# Patient Record
Sex: FEMALE | Race: WHITE
[De-identification: names, ages, dates, MRNs, and addresses within clinical notes are randomized per-mention and may not be internally consistent; named-entity substitution may affect disease eponyms.]

---
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
## 2021-11-25 ENCOUNTER — HOSPITAL ENCOUNTER (EMERGENCY)
Dept: HOSPITAL 61 - ER | Age: 21
Discharge: HOME | End: 2021-11-25
Payer: COMMERCIAL

## 2021-11-25 VITALS — HEIGHT: 66 IN | WEIGHT: 151.9 LBS | BODY MASS INDEX: 24.41 KG/M2

## 2021-11-25 VITALS — DIASTOLIC BLOOD PRESSURE: 65 MMHG | SYSTOLIC BLOOD PRESSURE: 125 MMHG

## 2021-11-25 DIAGNOSIS — F17.200: ICD-10-CM

## 2021-11-25 DIAGNOSIS — J06.9: Primary | ICD-10-CM

## 2021-11-25 PROCEDURE — 71045 X-RAY EXAM CHEST 1 VIEW: CPT

## 2021-11-25 PROCEDURE — 99283 EMERGENCY DEPT VISIT LOW MDM: CPT

## 2021-11-25 NOTE — RAD
EXAM: Chest, single view.



HISTORY: Cough.



COMPARISON: None.



FINDINGS: A frontal view of the chest is obtained. There is no infiltrate, pleural effusion or pneumo
thorax. The heart is normal in size.



IMPRESSION: No acute pulmonary finding.



Electronically signed by: Alix Arguello MD (11/25/2021 2:16 PM) [facility]

## 2023-06-02 NOTE — PHYS DOC
"- Kidney US 6/1 with impression of "Improved intraparenchymal arterial resistive indices which measure mildly elevated on today's exam. Improved main renal vein velocity at the anastomosis. Large minimally complex peritransplant collection extending the entire length of the allograft.  Collection may be new or represent significant interval enlargement of prior collection.".   - IR consulted for drain placement; appreciate assitance. Fluid studies ordered.    " Past Medical History


Past Medical History:  No Pertinent History


Past Surgical History:  Tonsillectomy


Smoking Status:  Current Every Day Smoker


Alcohol Use:  None


Drug Use:  Marijuana





General Adult


EDM:


Chief Complaint:  COUGH





HPI:


HPI:





Patient is a 21  year old female with no significant medical history presenting 

to the ED today complaining of cough, nasal congestion, subjective fevers, sore 

throat, symptoms began on Monday.  Patient is in the ED with the daughter with 

similar complaints.  She states she is fully vaccinated against COVID-19.





Review of Systems:


Review of Systems:


Constitutional:   reports fever


Eyes:   Denies change in visual acuity. []


HENT:   Reports nasal congestion and sore throat. [] 


Respiratory:   Reports cough, denies shortness of breath. [] 


Cardiovascular:   Denies chest pain or edema. [] 


GI:   Denies abdominal pain, nausea, vomiting, bloody stools or diarrhea. [] 


:  Denies dysuria. [] 


Musculoskeletal:   Denies back pain or joint pain. [] 


Integument:   Denies rash. [] 


Neurologic:   Denies headache, focal weakness or sensory changes. [] 


Psychiatric:  Denies depression or anxiety. []





Heart Score:


C/O Chest Pain:  N/A


Risk Factors:


Risk Factors:  DM, Current or recent (<one month) smoker, HTN, HLP, family 

history of CAD, obesity.


Risk Scores:


Score 0 - 3:  2.5% MACE over next 6 weeks - Discharge Home


Score 4 - 6:  20.3% MACE over next 6 weeks - Admit for Clinical Observation


Score 7 - 10:  72.7% MACE over next 6 weeks - Early Invasive Strategies





Allergies:


Allergies:





Allergies








Coded Allergies Type Severity Reaction Last Updated Verified


 


  No Known Drug Allergies    5/13/18 No











Physical Exam:


PE:





Constitutional: Well developed, well nourished, no acute distress, non-toxic 

appearance. []


HENT: Normocephalic, atraumatic, bilateral external ears normal, oropharynx 

moist, no oral exudates, nose normal. []


Eyes: PERRLA, EOMI, conjunctiva normal, no discharge. [] 


Neck: Normal range of motion, no tenderness, supple, no stridor. [] 


Cardiovascular:Heart rate regular rhythm, no murmur []


Lungs & Thorax:  Bilateral breath sounds clear to auscultation []


Abdomen: Bowel sounds normal, soft, no tenderness, no masses, no pulsatile 

masses. [] 


Skin: Warm, dry, no erythema, no rash. [] 


Back: No tenderness, no CVA tenderness. [] 


Extremities: No tenderness, no cyanosis, no clubbing, ROM intact, no edema. [] 


Neurologic: Alert and oriented X 3, normal motor function, normal sensory 

function, no focal deficits noted. []


Psychologic: Affect normal, judgement normal, mood normal. []





Current Patient Data:


Vital Signs:





                                   Vital Signs








  Date Time  Temp Pulse Resp B/P (MAP) Pulse Ox O2 Delivery O2 Flow Rate FiO2


 


11/25/21 14:00 98.4 87 18 125/65 (85) 97 Room Air  





 98.4       











EKG:


EKG:


[]





Radiology/Procedures:


Radiology/Procedures:


[]PROCEDURE: CHEST AP ONLY





EXAM: Chest, single view.





HISTORY: Cough.





COMPARISON: None.





FINDINGS: A frontal view of the chest is obtained. There is no infiltrate, 

pleural effusion or pneumothorax. The heart is normal in size.





IMPRESSION: No acute pulmonary finding.





Electronically signed by: Alix Arguello MD (11/25/2021 2:16 PM) [facility]














DICTATED and SIGNED BY:     ALIX ARGUELLO MD


DATE:     11/25/21 [id number]





Course & Med Decision Making:


Course & Med Decision Making


Pertinent Labs and Imaging studies reviewed. (See chart for details)





This is a 21-year-old female patient presenting to the ED today complaining of 

cough, body aches, sore throat, nasal congestion, symptoms began on Monday.  

Chest x-ray interpreted by radiologist as negative for any acute findings.  

Patient symptoms are likely viral.  Discharged home.  Supportive care measures 

recommended





Dragon Disclaimer:


Dragon Disclaimer:


This electronic medical record was generated, in whole or in part, using a voice

 recognition dictation system.





Departure


Departure


Impression:  


   Primary Impression:  


   Fever


   Qualified Codes:  R50.9 - Fever, unspecified


   Additional Impressions:  


   Cough


   URI (upper respiratory infection)


   Qualified Codes:  J06.9 - Acute upper respiratory infection, unspecified


Disposition:  01 HOME / SELF CARE / HOMELESS


Condition:  STABLE


Referrals:  


NO PCP (PCP)


follow up with your doctor in one week


Patient Instructions:  Cough, Adult, Easy-to-Read, Fever, Adult, Easy-to-Read, 

Upper Respiratory Infection, Adult, Easy-to-Read





Additional Instructions:  


You were evaluated in the emergency room your chest x-ray is negative for any 

acute findings.  Your symptoms are likely viral.  We encourage you to take 

over-the-counter cough and cold medicines as needed.  Push fluids, take Tylenol 

or Motrin for pain or fever  follow up with your doctor in 1-2 weeks











NAVEEN BELLO            Nov 25, 2021 14:25